# Patient Record
Sex: MALE | Race: WHITE | NOT HISPANIC OR LATINO | Employment: FULL TIME | URBAN - METROPOLITAN AREA
[De-identification: names, ages, dates, MRNs, and addresses within clinical notes are randomized per-mention and may not be internally consistent; named-entity substitution may affect disease eponyms.]

---

## 2021-04-15 ENCOUNTER — OFFICE VISIT (OUTPATIENT)
Dept: URGENT CARE | Facility: CLINIC | Age: 26
End: 2021-04-15

## 2021-04-15 VITALS
HEART RATE: 72 BPM | WEIGHT: 185 LBS | BODY MASS INDEX: 23.74 KG/M2 | TEMPERATURE: 98.3 F | RESPIRATION RATE: 16 BRPM | DIASTOLIC BLOOD PRESSURE: 78 MMHG | HEIGHT: 74 IN | OXYGEN SATURATION: 99 % | SYSTOLIC BLOOD PRESSURE: 144 MMHG

## 2021-04-15 DIAGNOSIS — K64.5 EXTERNAL HEMORRHOID, THROMBOSED: ICD-10-CM

## 2021-04-15 PROCEDURE — 99999 PR NO CHARGE: CPT | Performed by: PHYSICIAN ASSISTANT

## 2021-04-15 PROCEDURE — 46320 REMOVAL OF HEMORRHOID CLOT: CPT | Performed by: PHYSICIAN ASSISTANT

## 2021-04-15 RX ORDER — NITROGLYCERIN 4 MG/G
OINTMENT RECTAL
Qty: 30 G | Refills: 1 | Status: SHIPPED | OUTPATIENT
Start: 2021-04-15 | End: 2021-04-15

## 2021-04-15 RX ORDER — CEPHALEXIN 500 MG/1
500 CAPSULE ORAL 4 TIMES DAILY
Qty: 20 CAPSULE | Refills: 0 | Status: SHIPPED | OUTPATIENT
Start: 2021-04-15 | End: 2021-04-20

## 2021-04-15 ASSESSMENT — ENCOUNTER SYMPTOMS
BLOOD IN STOOL: 0
DIZZINESS: 0
ABDOMINAL PAIN: 0
VOMITING: 0
CONSTIPATION: 0
DIARRHEA: 0
BRUISES/BLEEDS EASILY: 0
NAUSEA: 0
HEADACHES: 0
FEVER: 0
CHILLS: 0

## 2021-04-15 NOTE — PROGRESS NOTES
"Subjective:   Ralf Sainz is a 26 y.o. male who presents for Hemorrhoids (x2 wks )      HPI  26 y.o. male presents to urgent care with new problem to provider of mildly painful lump in rectal area noted about 2 weeks ago after lifting weight at the gym.  Symptoms are worse in the afternoon and patient notes the lump is larger and more painful. Hx of similar with external hemorrhoids.   Denies constipation, diarrhea, rectal bleeding, or blood in stools.   OTC preparation H with minimal relief.  Denies other associated aggravating or alleviating factors.     Review of Systems   Constitutional: Negative for chills and fever.   Gastrointestinal: Negative for abdominal pain, blood in stool, constipation, diarrhea, melena, nausea and vomiting.   Genitourinary:        Rectal pain   Neurological: Negative for dizziness and headaches.   Endo/Heme/Allergies: Does not bruise/bleed easily.       There is no problem list on file for this patient.    History reviewed. No pertinent surgical history.  Social History     Tobacco Use   • Smoking status: Never Smoker   • Smokeless tobacco: Never Used   Substance Use Topics   • Alcohol use: Never   • Drug use: Never      History reviewed. No pertinent family history.   (Allergies, Medications, & Tobacco/Substance Use were reconciled by the Medical Assistant and reviewed by myself. The family history is prepopulated)     Objective:     /78   Pulse 72   Temp 36.8 °C (98.3 °F) (Temporal)   Resp 16   Ht 1.88 m (6' 2\")   Wt 83.9 kg (185 lb)   SpO2 99%   BMI 23.75 kg/m²     Physical Exam  Vitals reviewed.   Constitutional:       General: He is not in acute distress.     Appearance: Normal appearance. He is well-developed. He is not ill-appearing.   HENT:      Head: Normocephalic and atraumatic.   Eyes:      Conjunctiva/sclera: Conjunctivae normal.   Cardiovascular:      Rate and Rhythm: Normal rate.   Pulmonary:      Effort: Pulmonary effort is normal. No respiratory distress. "   Genitourinary:     Rectum: Tenderness and external hemorrhoid present.       Musculoskeletal:      Cervical back: Normal range of motion and neck supple.   Skin:     General: Skin is warm and dry.   Neurological:      Mental Status: He is alert and oriented to person, place, and time.   Psychiatric:         Mood and Affect: Mood normal.         Behavior: Behavior normal.         Thought Content: Thought content normal.         Judgment: Judgment normal.       Procedure: Incision and evacuation of external thrombosed hemorrhoid  -Area cleaned and prepped in sterile fashion  -1% lidocaine without epi used to anesthetize area  -Small incision with 11 blade made  -Clot manually evacuated  -Minimal bleeding throughout procedure  -Patient tolerated well    Assessment/Plan:     1. External hemorrhoid, thrombosed  cephALEXin (KEFLEX) 500 MG Cap    REFERRAL TO GENERAL SURGERY    DISCONTINUED: Nitroglycerin 0.4 % Ointment     I discussed with patient conservative treatment for thrombosed hemorrhoid including Epsom salt baths, increase dietary fiber, over-the-counter stool softeners, preparation H cream/suppositories, and nitroglycerin ointment.  Discussed possibility of evacuation of thrombosis if conservative measures are unsuccessful.  Patient should return for reevaluation if symptoms persist or he experiences increasing pain or onset of rectal bleeding.      Addendum: Patient was unable to have nitroglycerin ointment filled.  Discussion with pharmacist regarding $700 pricing of nitroglycerin ointment and unavailability specific pharmacy.  Patient returns for incision and evacuation of thrombosis.  Patient was given contingent course of Keflex for development of secondary infection.  Discussed with patient that risk of infection is typically low.  Discussed with patient that he may need further evaluation and surgical excision of hemorrhoid if symptoms persist or worsen.  Patient was given referral to gen surgery as  well.     Differential diagnosis, natural history, supportive care, and indications for immediate follow-up discussed.    Advised the patient to follow-up with the primary care physician for recheck, reevaluation, and consideration of further management.  Patient verbalized understanding of treatment plan and has no further questions regarding care.     I personally reviewed prior external notes and test results pertinent to today's visit. My total time spent caring for the patient on the day of the encounter that included review of prior records, obtaining history, examination, discussion of plan and return precautions was less than 30 minutes.     Please note that this dictation was created using voice recognition software. I have made a reasonable attempt to correct obvious errors, but I expect that there are errors of grammar and possibly content that I did not discover before finalizing the note.    This note was electronically signed by Terrie Duggan PA-C

## 2022-03-01 ENCOUNTER — HOSPITAL ENCOUNTER (EMERGENCY)
Facility: MEDICAL CENTER | Age: 27
End: 2022-03-01
Attending: EMERGENCY MEDICINE
Payer: COMMERCIAL

## 2022-03-01 VITALS
WEIGHT: 183.64 LBS | SYSTOLIC BLOOD PRESSURE: 141 MMHG | OXYGEN SATURATION: 98 % | RESPIRATION RATE: 16 BRPM | TEMPERATURE: 98.3 F | BODY MASS INDEX: 23.57 KG/M2 | HEIGHT: 74 IN | HEART RATE: 72 BPM | DIASTOLIC BLOOD PRESSURE: 80 MMHG

## 2022-03-01 DIAGNOSIS — L03.116 CELLULITIS OF LEFT LOWER EXTREMITY: ICD-10-CM

## 2022-03-01 DIAGNOSIS — L02.91 ABSCESS: ICD-10-CM

## 2022-03-01 PROCEDURE — 700101 HCHG RX REV CODE 250

## 2022-03-01 PROCEDURE — 303977 HCHG I & D

## 2022-03-01 PROCEDURE — 99282 EMERGENCY DEPT VISIT SF MDM: CPT

## 2022-03-01 RX ORDER — CEPHALEXIN 500 MG/1
500 CAPSULE ORAL 4 TIMES DAILY
Qty: 40 CAPSULE | Refills: 0 | Status: SHIPPED | OUTPATIENT
Start: 2022-03-01 | End: 2022-03-11

## 2022-03-01 RX ORDER — LIDOCAINE HYDROCHLORIDE 10 MG/ML
INJECTION, SOLUTION INFILTRATION; PERINEURAL
Status: COMPLETED
Start: 2022-03-01 | End: 2022-03-01

## 2022-03-01 RX ADMIN — LIDOCAINE HYDROCHLORIDE: 10 INJECTION, SOLUTION INFILTRATION; PERINEURAL at 16:15

## 2022-03-01 NOTE — ED PROVIDER NOTES
"ED Provider Note    CHIEF COMPLAINT  Chief Complaint   Patient presents with   • Abscess     Lt inner proximal thigh x 48 hrs Red swollen and tender No drainage No fever       HPI  Ralf Sainz is a 26 y.o. male who presents for evaluation of possible abscess.  Patient states over the last 2 days he has developed redness over the medial upper inner left thigh.  He states it started with initial pinpoint area which he attempted to squeeze but did not get any pus out of it at that time.  He has had no systemic symptoms of fever chills and no other acute symptomatology or complaints.    REVIEW OF SYSTEMS  See HPI for further details.  He denies major health problems such as hypertension, diabetes, cardiopulmonary disorders, gastrointestinal disorders.    PAST MEDICAL HISTORY  Past Medical History:   Diagnosis Date   • Patient denies medical problems        FAMILY HISTORY  History reviewed. No pertinent family history.    SOCIAL HISTORY  Resides locally;    SURGICAL HISTORY  History reviewed. No pertinent surgical history.    CURRENT MEDICATIONS  Home Medications    **Home medications have not yet been reviewed for this encounter**         ALLERGIES  No Known Allergies    PHYSICAL EXAM  VITAL SIGNS: /78   Pulse 79   Temp 37 °C (98.6 °F) (Temporal)   Resp 14   Ht 1.88 m (6' 2\")   Wt 83.3 kg (183 lb 10.3 oz)   SpO2 99%   BMI 23.58 kg/m²    Constitutional: 26-year-old male, well developed, Well nourished, No acute distress, Non-toxic appearance.   HENT: Normocephalic,   Musculoskeletal: Left lower extremity to the upper inner thigh reveals an area of erythema edema and warmth with a small pinpoint ecchymotic area centrally; no large fluctuant areas identified; the patient has taken a marker and demarcated the circumference of the erythema;  Neurologic: Alert & oriented x 3,  No gross focal deficits noted.   Psychiatric: Affect normal, Judgment normal, Mood normal.       RADIOLOGY/PROCEDURES  1.  Incision and " drainage    COURSE & MEDICAL DECISION MAKING  Pertinent Labs & Imaging studies reviewed. (See chart for details)  Procedure note: The left upper inner thigh was prepped and draped in sterile fashion.  The skin was cleansed thoroughly.  Using #11 blade a stab incision was made at the ecchymotic central area and a scant amount of purulent blood was expressed.  I attempted to break up any type of potential loculations.  1/4 inch iodoform gauze was packed into the wound.  Area was dressed.  No complications.      Discussion: At this time, the patient has evidence of cellulitis with just a very small pinpoint area of abscess formation which was treated as noted above.  At this time, I have asked patient to return in 3 days for packing removal.  In the interim the patient will be placed on oral antibiotic therapy.  I discussed the findings and treatment plan with the patient.  He indicates he is comfortable with this explanation disposition.    FINAL IMPRESSION  1. Cellulitis of left lower extremity    2. Abscess           PLAN  1.  Appropriate discharge instructions given  2.  Keflex 500 mg #40  3.  Return in 3 days for reevaluation and packing removal    Electronically signed by: Guy G Gansert, M.D., 3/1/2022 4:00 PM

## 2022-03-02 NOTE — ED NOTES
Released with all follow-up, Rx given and pt advised to take all of the RX until gone. He will return in 3 days for wound check and packing removal per ERP.

## 2022-03-04 ENCOUNTER — HOSPITAL ENCOUNTER (EMERGENCY)
Facility: MEDICAL CENTER | Age: 27
End: 2022-03-04
Attending: EMERGENCY MEDICINE

## 2022-03-04 VITALS
TEMPERATURE: 97.9 F | HEART RATE: 65 BPM | BODY MASS INDEX: 23.91 KG/M2 | DIASTOLIC BLOOD PRESSURE: 66 MMHG | HEIGHT: 74 IN | SYSTOLIC BLOOD PRESSURE: 140 MMHG | RESPIRATION RATE: 18 BRPM | WEIGHT: 186.29 LBS | OXYGEN SATURATION: 97 %

## 2022-03-04 DIAGNOSIS — Z51.89 ENCOUNTER FOR WOUND RE-CHECK: ICD-10-CM

## 2022-03-04 PROCEDURE — 99282 EMERGENCY DEPT VISIT SF MDM: CPT

## 2022-03-04 RX ORDER — SULFAMETHOXAZOLE AND TRIMETHOPRIM 800; 160 MG/1; MG/1
1 TABLET ORAL 2 TIMES DAILY
Qty: 14 TABLET | Refills: 0 | Status: SHIPPED | OUTPATIENT
Start: 2022-03-04 | End: 2022-03-11

## 2022-03-04 NOTE — DISCHARGE INSTRUCTIONS
Continue taking Keflex and add Bactrim to cover for MRSA    Return to the ER for fever, increased redness, increased pain, or other concerns    Continue keeping the area clean and dry    Establish care with a primary care provider at the [facility] or the [employer].

## 2022-03-04 NOTE — ED PROVIDER NOTES
"ED Provider Note    CHIEF COMPLAINT  Chief Complaint   Patient presents with   • Wound Re-Check       HPI  Ralf Sainz is a 26 y.o. male who presents for recheck of his left thigh wound.    Patient states he had an incision and drainage of his left medial thigh on Tuesday.  He was placed on Keflex and has been taking this.  He states his symptoms became much improved upon awakening yesterday.  Up until then, he still had some redness and warmth noted outside of the delineated pen area.    Patient denies history of diabetes, fever, chills, significant trauma.  He states he believes he had an ingrown hair or that the seam of his pants are rubbing at this area and caused an opening in the skin.  He reports that he had a staph infection 10 years ago in high school when there was an outbreak in the locker room.      ALLERGIES  No Known Allergies    CURRENT MEDICATIONS  Keflex    PAST MEDICAL HISTORY   has a past medical history of Patient denies medical problems.    SURGICAL HISTORY  patient denies any surgical history    SOCIAL HISTORY  Social History     Tobacco Use   • Smoking status: Never Smoker   • Smokeless tobacco: Never Used   Vaping Use   • Vaping Use: Never used   Substance and Sexual Activity   • Alcohol use: Yes   • Drug use: Never   • Sexual activity: Not on file     Employed    REVIEW OF SYSTEMS  Patient admits to improving symptoms of the left thigh wound   pt denies fever, chills  See HPI for further details.       PHYSICAL EXAM  VITAL SIGNS: /78   Pulse 62   Temp 36.5 °C (97.7 °F) (Temporal)   Resp 18   Ht 1.88 m (6' 2\")   Wt 84.5 kg (186 lb 4.6 oz)   SpO2 98%   BMI 23.92 kg/m²     General:  WDWN male, nontoxic appearing in NAD; A+Ox3; V/S as above; afebrile, not tachycardic or hypotensive  Skin: warm and dry; good color; no rash  HEENT: NCAT; EOMs intact; PERRL; no scleral icterus   Neck: FROM; soft  Extremities: EGAN x 4; left medial thigh with open wound with packing present; there is " yellowish drainage soaking the packing but very faint erythema/pink discoloration to the immediate surrounding area; there is no crepitus or streaking and no induration  Neurologic: CNs III-XII grossly intact; speech clear; distal sensation intact; strength 5/5 UE/LEs  Psychiatric: Appropriate affect, normal mood    MEDICAL RECORD  I have reviewed the patient's medical record and pertinent results as listed.      COURSE & MEDICAL DECISION MAKING  I have reviewed any medical record information, laboratory studies and radiographic results as noted.    Appropriate PPE was worn at all times while interacting with the patient.    Ralf Sainz is a 26 y.o. male who presents for packing removal/wound recheck.  Patient states his wound is much improved.  He is compliant with his Keflex.  He is afebrile and nontoxic-appearing.  We have removed the packing and I do not feel that the wound requires repacking.  We discussed adding Bactrim given that he has had a staph infection in the remote past.  He is amenable to this and will comply.  Return precautions were discussed.    IMPRESSION  1. Encounter for wound re-check  sulfamethoxazole-trimethoprim (BACTRIM DS) 800-160 MG tablet            Electronically signed by: Jesenia Huerta M.D., 3/4/2022 9:54 AM